# Patient Record
Sex: MALE | Race: WHITE | NOT HISPANIC OR LATINO | Employment: UNEMPLOYED | URBAN - NONMETROPOLITAN AREA
[De-identification: names, ages, dates, MRNs, and addresses within clinical notes are randomized per-mention and may not be internally consistent; named-entity substitution may affect disease eponyms.]

---
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
## 2025-03-19 ENCOUNTER — OFFICE VISIT (OUTPATIENT)
Dept: URGENT CARE | Facility: MEDICAL CENTER | Age: 20
End: 2025-03-19
Payer: OTHER GOVERNMENT

## 2025-03-19 VITALS
TEMPERATURE: 97 F | SYSTOLIC BLOOD PRESSURE: 136 MMHG | DIASTOLIC BLOOD PRESSURE: 64 MMHG | RESPIRATION RATE: 18 BRPM | HEIGHT: 66 IN | WEIGHT: 225 LBS | BODY MASS INDEX: 36.16 KG/M2 | HEART RATE: 67 BPM | OXYGEN SATURATION: 99 %

## 2025-03-19 DIAGNOSIS — H69.93 ETD (EUSTACHIAN TUBE DYSFUNCTION), BILATERAL: Primary | ICD-10-CM

## 2025-03-19 PROCEDURE — G0463 HOSPITAL OUTPT CLINIC VISIT: HCPCS | Performed by: PHYSICIAN ASSISTANT

## 2025-03-19 PROCEDURE — 99202 OFFICE O/P NEW SF 15 MIN: CPT | Performed by: PHYSICIAN ASSISTANT

## 2025-03-19 RX ORDER — PREDNISONE 10 MG/1
TABLET ORAL
Qty: 30 TABLET | Refills: 0 | Status: SHIPPED | OUTPATIENT
Start: 2025-03-19

## 2025-03-19 RX ORDER — AMOXICILLIN 875 MG/1
875 TABLET, COATED ORAL 2 TIMES DAILY
Qty: 14 TABLET | Refills: 0 | Status: SHIPPED | OUTPATIENT
Start: 2025-03-19 | End: 2025-03-26

## 2025-03-19 NOTE — PATIENT INSTRUCTIONS
Start antibiotic as prescribed  Start Prednisone taper  Start Flonase  Take Tylenol or Ibuprofen as needed for fever or pain  Drink plenty of fluids  Follow up with PCP to ensure infection has resolved

## 2025-03-19 NOTE — PROGRESS NOTES
[facility]        NAME: Ravin Zelaya is a 19 y.o. male  : 2005    MRN: [id number]  DATE: 2025  TIME: 1:19 PM    Assessment and Plan   ETD (Eustachian tube dysfunction), bilateral [H69.93]  1. ETD (Eustachian tube dysfunction), bilateral  predniSONE 10 mg tablet    amoxicillin (AMOXIL) 875 mg tablet            Patient Instructions     Start antibiotic as prescribed  Start Prednisone taper  Start Flonase  Take Tylenol or Ibuprofen as needed for fever or pain  Drink plenty of fluids  Follow up with PCP to ensure infection has resolved    Follow up with PCP in 3-5 days.  Proceed to  ER if symptoms worsen.    If tests have been performed at [facility], our office will contact you with results if changes need to be made to the care plan discussed with you at the visit.  [facility]    Chief Complaint     Chief Complaint   Patient presents with    Earache     B/l ear pain was on a plane coming home from [facility] last month and some dizziness noted          History of Present Illness       Patient presents with bilateral ear discomfort since while flying home from [facility] the end of February.  He states his ears feel like they need to pop all the time.  He has slightly muffled hearing.  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  Patient is having postnasal drip and runny nose from allergies but no further cold-like symptoms fever or chills.        Review of Systems   Review of Systems   Constitutional:  Negative for chills and fever.   HENT:  Positive for ear pain, hearing loss, postnasal drip and rhinorrhea. Negative for congestion and sore throat.    Respiratory:  Negative for cough.          Current Medications       Current Outpatient Medications:     amoxicillin (AMOXIL) 875 mg tablet, Take 1 tablet (875 mg total) by mouth 2 (two) times a day for 7 days, Disp: 14 tablet, Rfl: 0    predniSONE 10 mg tablet, 5 tabs  "once daily x 2 days then decrease by 1 tab every other day until finished, Disp: 30 tablet, Rfl: 0    buPROPion (WELLBUTRIN XL) 150 mg 24 hr tablet, Take 150 mg by mouth daily (Patient not taking: Reported on 3/19/2025), Disp: , Rfl:     Current Allergies     Allergies as of 03/19/2025    (No Known Allergies)            The following portions of the patient's history were reviewed and updated as appropriate: allergies, current medications, past family history, past medical history, past social history, past surgical history and problem list.     History reviewed. No pertinent past medical history.    Past Surgical History:   Procedure Laterality Date    KNEE SURGERY  01/17/2020       History reviewed. No pertinent family history.      Medications have been verified.        Objective   /64   Pulse 67   Temp (!) 97 °F (36.1 °C)   Resp 18   Ht 5' 6\" (1.676 m)   Wt 102 kg (225 lb)   SpO2 99%   BMI 36.32 kg/m²   No LMP for male patient.       Physical Exam     Physical Exam  Vitals and nursing note reviewed.   Constitutional:       Appearance: Normal appearance.   HENT:      Head: Normocephalic and atraumatic.      Right Ear: Ear canal normal.      Left Ear: Ear canal normal.      Ears:      Comments: Bilateral TMs without erythema but is significantly diminished light reflex, no bulging.     Mouth/Throat:      Mouth: Mucous membranes are moist.   Eyes:      Conjunctiva/sclera: Conjunctivae normal.   Cardiovascular:      Rate and Rhythm: Normal rate and regular rhythm.      Heart sounds: Normal heart sounds.   Pulmonary:      Effort: Pulmonary effort is normal.      Breath sounds: Normal breath sounds.   Musculoskeletal:      Cervical back: Neck supple.   Lymphadenopathy:      Cervical: No cervical adenopathy.   Skin:     General: Skin is warm.   Neurological:      Mental Status: He is alert.                   "

## 2025-03-25 ENCOUNTER — TELEPHONE (OUTPATIENT)
Age: 20
End: 2025-03-25

## 2025-03-25 NOTE — TELEPHONE ENCOUNTER
Patient seen in [facility] for ear pain and popping after a flight. Finished abx without improvement. First available in [facility] is July. Provided the phone number for [facility] for a sooner appointment.